# Patient Record
Sex: FEMALE | Race: WHITE | NOT HISPANIC OR LATINO | Employment: OTHER | URBAN - METROPOLITAN AREA
[De-identification: names, ages, dates, MRNs, and addresses within clinical notes are randomized per-mention and may not be internally consistent; named-entity substitution may affect disease eponyms.]

---

## 2017-04-07 ENCOUNTER — OFFICE VISIT (OUTPATIENT)
Dept: FAMILY MEDICINE | Facility: CLINIC | Age: 37
End: 2017-04-07
Payer: COMMERCIAL

## 2017-04-07 VITALS
SYSTOLIC BLOOD PRESSURE: 119 MMHG | HEART RATE: 61 BPM | OXYGEN SATURATION: 99 % | TEMPERATURE: 97 F | BODY MASS INDEX: 24.83 KG/M2 | DIASTOLIC BLOOD PRESSURE: 58 MMHG | WEIGHT: 149 LBS | HEIGHT: 65 IN

## 2017-04-07 DIAGNOSIS — Z20.818 STREP THROAT EXPOSURE: ICD-10-CM

## 2017-04-07 DIAGNOSIS — R07.0 THROAT PAIN: Primary | ICD-10-CM

## 2017-04-07 LAB
DEPRECATED S PYO AG THROAT QL EIA: NORMAL
MICRO REPORT STATUS: NORMAL
SPECIMEN SOURCE: NORMAL

## 2017-04-07 PROCEDURE — 87081 CULTURE SCREEN ONLY: CPT | Performed by: PHYSICIAN ASSISTANT

## 2017-04-07 PROCEDURE — 87880 STREP A ASSAY W/OPTIC: CPT | Performed by: PHYSICIAN ASSISTANT

## 2017-04-07 PROCEDURE — 99213 OFFICE O/P EST LOW 20 MIN: CPT | Performed by: PHYSICIAN ASSISTANT

## 2017-04-07 NOTE — PROGRESS NOTES
SUBJECTIVE:                                                    Brittany Carver is a 36 year old female who presents to clinic today for the following health issues:      RESPIRATORY SYMPTOMS      Duration: 3 days    Description  Throat pain, runny nose, congestion, bumps on both hands    Severity: mild    Accompanying signs and symptoms: None    History (predisposing factors):  strep exposure    Precipitating or alleviating factors: None    Therapies tried and outcome:  none         Problem list and histories reviewed & adjusted, as indicated.  Additional history: as documented    Patient Active Problem List   Diagnosis     CARDIOVASCULAR SCREENING; LDL GOAL LESS THAN 160     Thyroid function study abnormality     Encounter for supervision of other normal pregnancy     Rh negative status during pregnancy     Low-lying placenta     Past Surgical History:   Procedure Laterality Date     C APPENDECTOMY  1993     TONSILLECTOMY  2001    due to obstruction       Social History   Substance Use Topics     Smoking status: Never Smoker     Smokeless tobacco: Never Used     Alcohol use No     Family History   Problem Relation Age of Onset     Breast Cancer Mother 55     age 55     Thyroid Disease Mother      late in life     Thyroid Disease Maternal Grandmother      late in life     Alzheimer Disease Maternal Grandfather      Thyroid Disease Maternal Grandfather      DIABETES Paternal Grandmother      diet and exercise controlled     Thyroid Disease Maternal Aunt      Thyroid Disease Maternal Uncle      CANCER Paternal Grandfather      C.A.D. No family hx of      Hypertension No family hx of      Cancer - colorectal No family hx of      Anesthesia Reaction No family hx of      Blood Disease No family hx of          Current Outpatient Prescriptions   Medication Sig Dispense Refill     MULTI-VITAMIN OR 3 tablets once a day       Allergies   Allergen Reactions     Nkda [No Known Drug Allergies]      Labs reviewed in  "EPIC    Reviewed and updated as needed this visit by clinical staff  Tobacco  Allergies  Meds  Med Hx  Surg Hx  Fam Hx  Soc Hx      Reviewed and updated as needed this visit by Provider         ROS:  Constitutional, HEENT, cardiovascular, pulmonary, gi and gu systems are negative, except as otherwise noted.    OBJECTIVE:                                                    /58  Pulse 61  Temp 97  F (36.1  C) (Oral)  Ht 5' 5\" (1.651 m)  Wt 149 lb (67.6 kg)  SpO2 99%  BMI 24.79 kg/m2  Body mass index is 24.79 kg/(m^2).  GENERAL: healthy, alert and no distress  EYES: Eyes grossly normal to inspection, PERRL and conjunctivae and sclerae normal  HENT: ear canals and TM's normal, nose and mouth without ulcers or lesions  NECK: no adenopathy, no asymmetry, masses, or scars and thyroid normal to palpation  RESP: lungs clear to auscultation - no rales, rhonchi or wheezes  CV: regular rate and rhythm, normal S1 S2, no S3 or S4, no murmur, click or rub, no peripheral edema and peripheral pulses strong    Diagnostic Test Results:  Results for orders placed or performed in visit on 04/07/17 (from the past 24 hour(s))   Rapid strep screen   Result Value Ref Range    Specimen Description Throat     Rapid Strep A Screen       NEGATIVE: No Group A streptococcal antigen detected by immunoassay, await   culture report.      Micro Report Status FINAL 04/07/2017         ASSESSMENT/PLAN:                                                        ICD-10-CM    1. Throat pain R07.0 Rapid strep screen     Beta strep group A culture   2. Strep throat exposure Z20.818    Warning signs discussed.  side effects discussed  Symptomatic treatment: such as fluids,  OTC acetaminophen and /or non-steroidal anti-inflammatory medication.  Follow up  1-2 wks as needed      Felipe Pearson PA-C  [facility]    "

## 2017-04-07 NOTE — MR AVS SNAPSHOT
"              After Visit Summary   4/7/2017    Brittany Carver    MRN: [id number]           Patient Information     Date Of Birth          1980        Visit Information        Provider Department      4/7/2017 2:10 PM Felipe Pearson PA-C [facility]        Today's Diagnoses     Throat pain    -  1    Strep throat exposure           Follow-ups after your visit        Who to contact     If you have questions or need follow up information about today's clinic visit or your schedule please contact [facility] directly at [phone].  Normal or non-critical lab and imaging results will be communicated to you by [employer], letter or phone within 4 business days after the clinic has received the results. If you do not hear from us within 7 days, please contact the clinic through [employer] or phone. If you have a critical or abnormal lab result, we will notify you by phone as soon as possible.  Submit refill requests through [employer] or call your pharmacy and they will forward the refill request to us. Please allow 3 business days for your refill to be completed.          Additional Information About Your Visit        MyChart Information     [employer] gives you secure access to your electronic health record. If you see a primary care provider, you can also send messages to your care team and make appointments. If you have questions, please call your primary care clinic.  If you do not have a primary care provider, please call [phone] and they will assist you.        Care EveryWhere ID     This is your Care EveryWhere ID. This could be used by other organizations to access your [place] medical records  [id number]        Your Vitals Were     Pulse Temperature Height Pulse Oximetry BMI (Body Mass Index)       61 97  F (36.1  C) (Oral) 5' 5\" (1.651 m) 99% 24.79 kg/m2        Blood Pressure from Last 3 Encounters:   04/07/17 119/58   11/14/16 126/76   11/02/16 109/59    Weight from " Last 3 Encounters:   04/07/17 149 lb (67.6 kg)   11/14/16 152 lb 9.6 oz (69.2 kg)   11/02/16 146 lb (66.2 kg)              We Performed the Following     Beta strep group A culture     Rapid strep screen        Primary Care Provider Office Phone # Fax #    Yaya Richard [phone]       [facility] [address] [facility] [address]        Thank you!     Thank you for choosing [facility]  for your care. Our goal is always to provide you with excellent care. Hearing back from our patients is one way we can continue to improve our services. Please take a few minutes to complete the written survey that you may receive in the mail after your visit with us. Thank you!             Your Updated Medication List - Protect others around you: Learn how to safely use, store and throw away your medicines at www.disposemymeds.org.          This list is accurate as of: 4/7/17  3:34 PM.  Always use your most recent med list.                   Brand Name Dispense Instructions for use    MULTI-VITAMIN PO      3 tablets once a day

## 2017-04-07 NOTE — NURSING NOTE
"Chief Complaint   Patient presents with     Pharyngitis       Initial /58  Pulse 61  Temp 97  F (36.1  C) (Oral)  Ht 5' 5\" (1.651 m)  Wt 149 lb (67.6 kg)  SpO2 99%  BMI 24.79 kg/m2 Estimated body mass index is 24.79 kg/(m^2) as calculated from the following:    Height as of this encounter: 5' 5\" (1.651 m).    Weight as of this encounter: 149 lb (67.6 kg).  Medication Reconciliation: complete     Kiesha Carroll, viraj      "

## 2017-04-09 LAB
BACTERIA SPEC CULT: NORMAL
MICRO REPORT STATUS: NORMAL
SPECIMEN SOURCE: NORMAL

## 2017-10-01 ENCOUNTER — HEALTH MAINTENANCE LETTER (OUTPATIENT)
Age: 37
End: 2017-10-01

## 2019-11-14 ENCOUNTER — DOCUMENTATION ONLY (OUTPATIENT)
Dept: LAB | Facility: CLINIC | Age: 39
End: 2019-11-14

## 2019-11-14 DIAGNOSIS — R94.6 THYROID FUNCTION STUDY ABNORMALITY: ICD-10-CM

## 2019-11-14 DIAGNOSIS — Z00.00 ROUTINE GENERAL MEDICAL EXAMINATION AT A HEALTH CARE FACILITY: ICD-10-CM

## 2019-11-14 DIAGNOSIS — Z13.1 SCREENING FOR DIABETES MELLITUS: ICD-10-CM

## 2019-11-14 DIAGNOSIS — Z13.6 CARDIOVASCULAR SCREENING; LDL GOAL LESS THAN 160: Primary | ICD-10-CM

## 2019-11-14 NOTE — PROGRESS NOTES
Patient has an up coming pre visit lab appointment on 11.19.2019 and is also requesting a TSH. Please review and place future orders that may be needed.     Thank you  Sarita Truong MLT ([facility])

## 2019-11-15 NOTE — PROGRESS NOTES
Please review and sign lab orders.  The patients wants her Thyroid labs checked so I pended an order for you to decide.  Lab 11/19/19  Physical 11/26/19  Kenyatta Villalobos,

## 2019-11-19 DIAGNOSIS — R94.6 THYROID FUNCTION STUDY ABNORMALITY: ICD-10-CM

## 2019-11-19 DIAGNOSIS — Z13.1 SCREENING FOR DIABETES MELLITUS: ICD-10-CM

## 2019-11-19 DIAGNOSIS — Z00.00 ROUTINE GENERAL MEDICAL EXAMINATION AT A HEALTH CARE FACILITY: ICD-10-CM

## 2019-11-19 DIAGNOSIS — Z13.6 CARDIOVASCULAR SCREENING; LDL GOAL LESS THAN 160: ICD-10-CM

## 2019-11-19 LAB
ALBUMIN SERPL-MCNC: 3.6 G/DL (ref 3.4–5)
ALP SERPL-CCNC: 67 U/L (ref 40–150)
ALT SERPL W P-5'-P-CCNC: 23 U/L (ref 0–50)
ANION GAP SERPL CALCULATED.3IONS-SCNC: 5 MMOL/L (ref 3–14)
AST SERPL W P-5'-P-CCNC: 15 U/L (ref 0–45)
BILIRUB SERPL-MCNC: 0.5 MG/DL (ref 0.2–1.3)
BUN SERPL-MCNC: 9 MG/DL (ref 7–30)
CALCIUM SERPL-MCNC: 8.5 MG/DL (ref 8.5–10.1)
CHLORIDE SERPL-SCNC: 105 MMOL/L (ref 94–109)
CHOLEST SERPL-MCNC: 170 MG/DL
CO2 SERPL-SCNC: 28 MMOL/L (ref 20–32)
CREAT SERPL-MCNC: 0.77 MG/DL (ref 0.52–1.04)
GFR SERPL CREATININE-BSD FRML MDRD: >90 ML/MIN/{1.73_M2}
GLUCOSE SERPL-MCNC: 83 MG/DL (ref 70–99)
HDLC SERPL-MCNC: 60 MG/DL
LDLC SERPL CALC-MCNC: 86 MG/DL
NONHDLC SERPL-MCNC: 110 MG/DL
POTASSIUM SERPL-SCNC: 3.8 MMOL/L (ref 3.4–5.3)
PROT SERPL-MCNC: 6.9 G/DL (ref 6.8–8.8)
SODIUM SERPL-SCNC: 138 MMOL/L (ref 133–144)
T4 FREE SERPL-MCNC: 0.77 NG/DL (ref 0.76–1.46)
TRIGL SERPL-MCNC: 122 MG/DL
TSH SERPL DL<=0.005 MIU/L-ACNC: 11.11 MU/L (ref 0.4–4)

## 2019-11-19 PROCEDURE — 80053 COMPREHEN METABOLIC PANEL: CPT | Performed by: FAMILY MEDICINE

## 2019-11-19 PROCEDURE — 80061 LIPID PANEL: CPT | Performed by: FAMILY MEDICINE

## 2019-11-19 PROCEDURE — 36415 COLL VENOUS BLD VENIPUNCTURE: CPT | Performed by: FAMILY MEDICINE

## 2019-11-19 PROCEDURE — 84439 ASSAY OF FREE THYROXINE: CPT | Performed by: FAMILY MEDICINE

## 2019-11-19 PROCEDURE — 84443 ASSAY THYROID STIM HORMONE: CPT | Performed by: FAMILY MEDICINE

## 2019-11-26 ENCOUNTER — OFFICE VISIT (OUTPATIENT)
Dept: FAMILY MEDICINE | Facility: CLINIC | Age: 39
End: 2019-11-26
Payer: COMMERCIAL

## 2019-11-26 VITALS
HEIGHT: 65 IN | BODY MASS INDEX: 26.33 KG/M2 | WEIGHT: 158 LBS | TEMPERATURE: 98.3 F | HEART RATE: 91 BPM | SYSTOLIC BLOOD PRESSURE: 123 MMHG | DIASTOLIC BLOOD PRESSURE: 71 MMHG

## 2019-11-26 DIAGNOSIS — E03.9 HYPOTHYROIDISM, UNSPECIFIED TYPE: ICD-10-CM

## 2019-11-26 DIAGNOSIS — Z00.00 ROUTINE GENERAL MEDICAL EXAMINATION AT A HEALTH CARE FACILITY: ICD-10-CM

## 2019-11-26 DIAGNOSIS — Z12.4 SCREENING FOR MALIGNANT NEOPLASM OF CERVIX: Primary | ICD-10-CM

## 2019-11-26 DIAGNOSIS — Z28.21 VACCINATION REFUSED BY PATIENT: ICD-10-CM

## 2019-11-26 PROCEDURE — 87624 HPV HI-RISK TYP POOLED RSLT: CPT | Performed by: FAMILY MEDICINE

## 2019-11-26 PROCEDURE — G0145 SCR C/V CYTO,THINLAYER,RESCR: HCPCS | Performed by: FAMILY MEDICINE

## 2019-11-26 PROCEDURE — 99395 PREV VISIT EST AGE 18-39: CPT | Performed by: FAMILY MEDICINE

## 2019-11-26 RX ORDER — LEVOTHYROXINE SODIUM 50 UG/1
50 TABLET ORAL DAILY
Qty: 30 TABLET | Refills: 2 | Status: SHIPPED | OUTPATIENT
Start: 2019-11-26 | End: 2020-01-21

## 2019-11-26 ASSESSMENT — ENCOUNTER SYMPTOMS: BREAST MASS: 0

## 2019-11-26 ASSESSMENT — MIFFLIN-ST. JEOR: SCORE: 1397.56

## 2019-11-26 NOTE — PATIENT INSTRUCTIONS

## 2019-11-26 NOTE — PROGRESS NOTES
SUBJECTIVE:   CC: Brittany Carver is an 39 year old woman who presents for preventive health visit.     Healthy Habits:     Getting at least 3 servings of Calcium per day:  Yes    Bi-annual eye exam:  Yes    Dental care twice a year:  Yes    Sleep apnea or symptoms of sleep apnea:  None    Diet:  Regular (no restrictions)    Frequency of exercise:  None    Taking medications regularly:  Yes    Medication side effects:  None    PHQ-2 Total Score: 0    Additional concerns today:  Yes              Today's PHQ-2 Score:   PHQ-2 ( 1999 Pfizer) 11/26/2019   Q1: Little interest or pleasure in doing things 0   Q2: Feeling down, depressed or hopeless 0   PHQ-2 Score 0   Q1: Little interest or pleasure in doing things Not at all   Q2: Feeling down, depressed or hopeless Not at all   PHQ-2 Score 0       Abuse: Current or Past(Physical, Sexual or Emotional)- No  Do you feel safe in your environment? Yes        Social History     Tobacco Use     Smoking status: Never Smoker     Smokeless tobacco: Never Used   Substance Use Topics     Alcohol use: No         Alcohol Use 11/26/2019   Prescreen: >3 drinks/day or >7 drinks/week? Not Applicable       Reviewed orders with patient.  Reviewed health maintenance and updated orders accordingly - Yes          Pertinent mammograms are reviewed under the imaging tab.  History of abnormal Pap smear:   PAP / HPV 2/19/2013 1/5/2010   PAP NIL NIL     Reviewed and updated as needed this visit by clinical staff         Reviewed and updated as needed this visit by Provider            Review of Systems   Breasts:  Negative for tenderness, breast mass and discharge.   Genitourinary: Negative for pelvic pain, vaginal bleeding and vaginal discharge.          OBJECTIVE:   There were no vitals taken for this visit.  Physical Exam          ASSESSMENT/PLAN:       ICD-10-CM    1. Screening for malignant neoplasm of cervix Z12.4 Pap imaged thin layer screen with HPV - recommended age 30 - 65 years  "(select HPV order below)     HPV High Risk Types DNA Cervical   2. Routine general medical examination at a health care facility Z00.00    3. Hypothyroidism, unspecified type E03.9 levothyroxine (SYNTHROID/LEVOTHROID) 50 MCG tablet     **TSH with free T4 reflex FUTURE 2mo   4. Vaccination refused by patient Z28.21        COUNSELING:  Reviewed preventive health counseling, as reflected in patient instructions       Regular exercise       Healthy diet/nutrition       Vision screening       Contraception       Family planning       Osteoporosis Prevention/Bone Health       HIV screeninx in teen years, 1x in adult years, and at intervals if high risk    Estimated body mass index is 24.79 kg/m  as calculated from the following:    Height as of 17: 1.651 m (5' 5\").    Weight as of 17: 67.6 kg (149 lb).    Weight management plan: start levo to treat her hypothyroidism      reports that she has never smoked. She has never used smokeless tobacco.      Counseling Resources:  ATP IV Guidelines  Pooled Cohorts Equation Calculator  Breast Cancer Risk Calculator  FRAX Risk Assessment  ICSI Preventive Guidelines  Dietary Guidelines for Americans,   [employer]'s MyPlate  ASA Prophylaxis  Lung CA Screening    Yaya Richard MD  [facility]  --------------------------------------------------------------------------------------------------------------------------------------    SUBJECTIVE:  Brittany Carver is a 39 year old female who presents to the clinic today for a routine physical exam.    The patient's last physical was .    Cholesterol   Date Value Ref Range Status   2019 170 <200 mg/dL Final   2010 184 0 - 200 mg/dL Final     Comment:     LDL Cholesterol is the primary guide to therapy.   The NCEP recommends further evaluation of: patients with cholesterol <200   mg/dL   if additional risk factors are present, cholesterol >240 mg/dL, triglycerides   >150 mg/dL, or HDL <40 mg/dL. "     HDL Cholesterol   Date Value Ref Range Status   11/19/2019 60 >49 mg/dL Final   08/30/2010 63 50 - 110 mg/dL Final     LDL Cholesterol Calculated   Date Value Ref Range Status   11/19/2019 86 <100 mg/dL Final     Comment:     Desirable:       <100 mg/dl   08/30/2010 109 0 - 129 mg/dL Final     Comment:     LDL Cholesterol is the primary guide to therapy: LDL-cholesterol goal in high   risk patients is <100 mg/dL and in very high risk patients is <70 mg/dL.     Triglycerides   Date Value Ref Range Status   11/19/2019 122 <150 mg/dL Final     Comment:     Fasting specimen   08/30/2010 61 0 - 150 mg/dL Final     Cholesterol/HDL Ratio   Date Value Ref Range Status   08/30/2010 2.9 0.0 - 5.0 Final     The patient's last fasting lipid panel was done 1 weeks ago and the results are listed above.      The ASCVD Risk score (Earl MILLER Jr., et al., 2013) failed to calculate for the following reasons:    The 2013 ASCVD risk score is only valid for ages 40 to 79      0.3% using an age of 40 on [employer] ATHEROSCLEROTIC CARDIOVASCULAR DISEASE marika       Risk Enhancers:  Family history of premature ASCVD  LDL >159  Chronic kidney disease   Metabolic Syndrome  Premature menopause  Inflammatory conditions (RA, psoriasis, HIV)  SE  Ancestry  Triglycerides >174        The patient reports that she has never been treated for high blood pressure.    The patient reports that she does not take a daily aspirin.        No results found for: HCVAB  The patient reports that she has not been screened for Hepatitis C    (Screen all baby boomers once per CDC-- the generation born from 1945 through 1965 and per USPTF screen age 19 to 79 especially younger people who have used IV drugs)  She would not like to have an Hepatitis C test today    No results found for: HIAGAB  The patient reports that she has been screened for HIV   (Screen all 15 to 64 years old)  She would not like to have an HIV test today            Immunization History    Administered Date(s) Administered     TD (ADULT, 7+) 04/01/2004     The patient has not started the Gardasil vaccination series.  The patient's believes that her last tetanus shot was given 15 year(s) ago.   The patient believes that she has not had a Shingrix in the past  The patient believes that she has not had a PPSV23 in the past.  The patient believes that she has not had a PCV13 in the past.  The patient believes that she has not had a seasonal flu vaccination this fall or winter.  The patient would like to have a no vaccinations today      No results found for this or any previous visit.]   The patient denies a family history of colon cancer.  The patient reports that she has not had a colonoscopy.     The patient reports a family history diabetes in her granmother.    The patient reports that she does performs a self breast exam monthly.  The patient reports a family history of breast cancer in her mother and maternal grandmother.  The patient does not want to have a mammogram done.  The patient does want to have a Pap smear done.  She reports that she has not had an abnormal pap smear.  The patient is not interested in contraception.    The patient reports that she eats or drinks 3 servings of dairy products per day.  The patient reports that she has not had a bone density scan.   (screen women 65+ or 50+ with risk factors)     The patient reports that she has dental appointments approximately every 6 months.  The patient reports that she  has an eye examination approximately every 1.0 year(s).        Do you currently smoke? No  How many years have you smoked? 0  How many packs per day did you smoke on average? N/A  (if more than 30 pack year history and the patient is age 55-80 consider ordering an annual low dose radiation lung CT to screen for cancer)  (Do not order if patient has quit more than 15 years ago or has a health condition that limits life expectancy or could not tolerate curative lung  surgery)  Are you interested having a lung CT to screen for lung cancer? N/A          Past Medical History:   Diagnosis Date     NO ACTIVE PROBLEMS        Past Surgical History:   Procedure Laterality Date     C APPENDECTOMY  1993     TONSILLECTOMY  2001    due to obstruction       Family History   Problem Relation Age of Onset     Breast Cancer Mother 55        age 55     Thyroid Disease Mother         late in life     Thyroid Disease Maternal Grandmother         late in life     Alzheimer Disease Maternal Grandfather      Thyroid Disease Maternal Grandfather      Diabetes Paternal Grandmother         diet and exercise controlled     Thyroid Disease Maternal Aunt      Thyroid Disease Maternal Uncle      Cancer Paternal Grandfather      Thyroid Disease Other      C.A.D. No family hx of      Hypertension No family hx of      Cancer - colorectal No family hx of      Anesthesia Reaction No family hx of      Blood Disease No family hx of        Social History     Socioeconomic History     Marital status:      Spouse name: Not on file     Number of children: Not on file     Years of education: Not on file     Highest education level: Not on file   Occupational History     Not on file   Social Needs     Financial resource strain: Not on file     Food insecurity:     Worry: Not on file     Inability: Not on file     Transportation needs:     Medical: Not on file     Non-medical: Not on file   Tobacco Use     Smoking status: Never Smoker     Smokeless tobacco: Never Used   Substance and Sexual Activity     Alcohol use: No     Drug use: No     Sexual activity: Yes     Partners: Male   Lifestyle     Physical activity:     Days per week: Not on file     Minutes per session: Not on file     Stress: Not on file   Relationships     Social connections:     Talks on phone: Not on file     Gets together: Not on file     Attends Hindu service: Not on file     Active member of club or organization: Not on file     Attends  "meetings of clubs or organizations: Not on file     Relationship status: Not on file     Intimate partner violence:     Fear of current or ex partner: Not on file     Emotionally abused: Not on file     Physically abused: Not on file     Forced sexual activity: Not on file   Other Topics Concern     Parent/sibling w/ CABG, MI or angioplasty before 65F 55M? Not Asked   Social History Narrative     Not on file       Current Outpatient Medications   Medication Sig Dispense Refill     MULTI-VITAMIN OR 3 tablets once a day           PHYSICAL EXAMINATION:  Blood pressure 123/71, pulse 91, temperature 98.3  F (36.8  C), temperature source Oral, height 1.651 m (5' 5\"), weight 71.7 kg (158 lb), unknown if currently breastfeeding.  General appearance - healthy, alert and no distress  Skin - Skin color, texture, turgor normal. No rashes or lesions.  Head - Normocephalic. No masses, lesions, tenderness or abnormalities  Eyes - conjunctivae/corneas clear. PERRL, EOM's intact. Fundi benign  Ears - External ears normal. Canals clear. TM's normal.  Nose/Sinuses - Nares normal. Septum midline. Mucosa normal. No drainage or sinus tenderness.  Oropharynx - Lips, mucosa, and tongue normal. Teeth and gums normal.  Neck - Neck supple. No adenopathy. Thyroid symmetric,  positive findings: mild thyromegaly  Lungs - Percussion normal. Good diaphragmatic excursion. Lungs clear  Heart - PMI normal. No lifts, heaves, or thrills. RRR. No murmurs, clicks gallops or rub  Breasts - Breasts normal to inspection and palpation. Axillae negative  Abdomen - Abdomen soft, non-tender. BS normal. No masses, organomegaly  Extremities - Extremities normal. No deformities, edema, or skin discoloration.  Musculoskeletal - Spine ROM normal. Muscular strength intact.  Peripheral pulses - radial=4/4, femoral=4/4, popliteal=4/4, dorsalis pedis=4/4,  Neuro - Gait normal. Reflexes normal and symmetric. Sensation grossly WNL.  Pelvic exam: normal vagina and vulva, " normal cervix without lesions or tenderness, uterus normal size anteverted, adenxa normal in size without tenderness, pap smear done, exam chaperoned by nurse.        Orders Only on 11/19/2019   Component Date Value Ref Range Status     Sodium 11/19/2019 138  133 - 144 mmol/L Final     Potassium 11/19/2019 3.8  3.4 - 5.3 mmol/L Final     Chloride 11/19/2019 105  94 - 109 mmol/L Final     Carbon Dioxide 11/19/2019 28  20 - 32 mmol/L Final     Anion Gap 11/19/2019 5  3 - 14 mmol/L Final     Glucose 11/19/2019 83  70 - 99 mg/dL Final    Fasting specimen     Urea Nitrogen 11/19/2019 9  7 - 30 mg/dL Final     Creatinine 11/19/2019 0.77  0.52 - 1.04 mg/dL Final     GFR Estimate 11/19/2019 >90  >60 mL/min/[1.73_m2] Final    Comment: Non  GFR Calc  Starting 12/18/2018, serum creatinine based estimated GFR (eGFR) will be   calculated using the Chronic Kidney Disease Epidemiology Collaboration   (CKD-EPI) equation.       GFR Estimate If Black 11/19/2019 >90  >60 mL/min/[1.73_m2] Final    Comment:  GFR Calc  Starting 12/18/2018, serum creatinine based estimated GFR (eGFR) will be   calculated using the Chronic Kidney Disease Epidemiology Collaboration   (CKD-EPI) equation.       Calcium 11/19/2019 8.5  8.5 - 10.1 mg/dL Final     Bilirubin Total 11/19/2019 0.5  0.2 - 1.3 mg/dL Final     Albumin 11/19/2019 3.6  3.4 - 5.0 g/dL Final     Protein Total 11/19/2019 6.9  6.8 - 8.8 g/dL Final     Alkaline Phosphatase 11/19/2019 67  40 - 150 U/L Final     ALT 11/19/2019 23  0 - 50 U/L Final     AST 11/19/2019 15  0 - 45 U/L Final     TSH 11/19/2019 11.11* 0.40 - 4.00 mU/L Final     Cholesterol 11/19/2019 170  <200 mg/dL Final     Triglycerides 11/19/2019 122  <150 mg/dL Final    Fasting specimen     HDL Cholesterol 11/19/2019 60  >49 mg/dL Final     LDL Cholesterol Calculated 11/19/2019 86  <100 mg/dL Final    Desirable:       <100 mg/dl     Non HDL Cholesterol 11/19/2019 110  <130 mg/dL Final     T4 Free  11/19/2019 0.77  0.76 - 1.46 ng/dL Final       ASSESSMENT:    ICD-10-CM    1. Screening for malignant neoplasm of cervix Z12.4 Pap imaged thin layer screen with HPV - recommended age 30 - 65 years (select HPV order below)     HPV High Risk Types DNA Cervical   2. Routine general medical examination at a health care facility Z00.00        Well-Adult Physical Exam.  Health Maintenance Due   Topic Date Due     PREVENTIVE CARE VISIT  09/07/2011     DTAP/TDAP/TD IMMUNIZATION (2 - Td) 04/01/2014     HPV  02/19/2018     PAP  02/19/2018     PHQ-2  01/01/2019     INFLUENZA VACCINE (1) 09/01/2019     Health Maintenance   Topic Date Due     PREVENTIVE CARE VISIT  09/07/2011     DTAP/TDAP/TD IMMUNIZATION (2 - Td) 04/01/2014     HPV  02/19/2018     PAP  02/19/2018     PHQ-2  01/01/2019     INFLUENZA VACCINE (1) 09/01/2019     HIV SCREENING  Completed     IPV IMMUNIZATION  Aged Out     MENINGITIS IMMUNIZATION  Aged Out         HEALTH CARE MAINTENENCE: The recommended screening tests and vaccinatons for this patient have been discussed as above.  The appropriate tests and vaccinations  have been ordered or declined by the patient. Please see the orders in EPIC.The patient specifically declines: vaccination(s)     Immunization Status:  up to date and documented except for influenza, tetanus    Patient Active Problem List   Diagnosis     CARDIOVASCULAR SCREENING; LDL GOAL LESS THAN 160     Thyroid function study abnormality     Encounter for supervision of other normal pregnancy     Rh negative status during pregnancy     Low-lying placenta        ATP III Guidelines  ICSI Preventive Guidelines    PLAN:  Start levothyroxine 50 mcg daily and recheck TSH in 6-8 weeks   HIV testing was discussed but the pt declined  Tdap recommended  Flu shot recommended  Breast self exam demonstrated and recommended  Pap smear was recommended and obtained  Discussed calcium intake, vitamins and supplements. Recommended 1200 mg of calcium  daily  Sunscreen use was recommended especially in the area of tatoos  Recommended dental exams every 6 months  Follow up in 1 year for the next preventative medical visit      Osteoporosis TX indications:  Post menopausal women with a hip or vertebral fracture  Any T score less than or equal to -2.5  Any T score between -1.0 and -2.5 and a 10 year hip fracture probability > or = to 3%  Any T score between -1.0 and -2.5 and a 10 year probability or a major osteoporosis-related fracture  > or = 20% based on FRAX score  [email or web address]                Body mass index is 26.29 kg/m .

## 2019-12-02 LAB
COPATH REPORT: NORMAL
PAP: NORMAL

## 2019-12-03 LAB
FINAL DIAGNOSIS: NORMAL
HPV HR 12 DNA CVX QL NAA+PROBE: NEGATIVE
HPV16 DNA SPEC QL NAA+PROBE: NEGATIVE
HPV18 DNA SPEC QL NAA+PROBE: NEGATIVE
SPECIMEN DESCRIPTION: NORMAL
SPECIMEN SOURCE CVX/VAG CYTO: NORMAL

## 2020-01-21 DIAGNOSIS — E03.9 HYPOTHYROIDISM, UNSPECIFIED TYPE: ICD-10-CM

## 2020-01-21 LAB
T4 FREE SERPL-MCNC: 1.27 NG/DL (ref 0.76–1.46)
TSH SERPL DL<=0.005 MIU/L-ACNC: 0.35 MU/L (ref 0.4–4)

## 2020-01-21 PROCEDURE — 36415 COLL VENOUS BLD VENIPUNCTURE: CPT | Performed by: FAMILY MEDICINE

## 2020-01-21 PROCEDURE — 84439 ASSAY OF FREE THYROXINE: CPT | Performed by: FAMILY MEDICINE

## 2020-01-21 PROCEDURE — 84443 ASSAY THYROID STIM HORMONE: CPT | Performed by: FAMILY MEDICINE

## 2020-01-21 RX ORDER — LEVOTHYROXINE SODIUM 88 UG/1
44 TABLET ORAL DAILY
Qty: 45 TABLET | Refills: 3 | Status: SHIPPED | OUTPATIENT
Start: 2020-01-21 | End: 2021-02-24

## 2020-02-23 ENCOUNTER — HEALTH MAINTENANCE LETTER (OUTPATIENT)
Age: 40
End: 2020-02-23

## 2020-12-13 ENCOUNTER — HEALTH MAINTENANCE LETTER (OUTPATIENT)
Age: 40
End: 2020-12-13

## 2021-01-15 ENCOUNTER — HEALTH MAINTENANCE LETTER (OUTPATIENT)
Age: 41
End: 2021-01-15

## 2021-02-23 DIAGNOSIS — E03.9 HYPOTHYROIDISM, UNSPECIFIED TYPE: ICD-10-CM

## 2021-02-24 RX ORDER — LEVOTHYROXINE SODIUM 88 UG/1
TABLET ORAL
Qty: 45 TABLET | Refills: 1 | Status: SHIPPED | OUTPATIENT
Start: 2021-02-24 | End: 2021-03-05

## 2021-02-24 NOTE — TELEPHONE ENCOUNTER
Due for TSH.    Next 5 appointments (look out 90 days)    Mar 12, 2021  3:00 PM  PHYSICAL with Yaya Richard MD  [facility] ([facility] ) [address] [facility] [address] [phone]        Bambi Patel BSN, RN

## 2021-03-03 ENCOUNTER — DOCUMENTATION ONLY (OUTPATIENT)
Dept: LAB | Facility: CLINIC | Age: 41
End: 2021-03-03

## 2021-03-03 DIAGNOSIS — Z13.1 SCREENING FOR DIABETES MELLITUS: ICD-10-CM

## 2021-03-03 DIAGNOSIS — Z00.00 ROUTINE HISTORY AND PHYSICAL EXAMINATION OF ADULT: Primary | ICD-10-CM

## 2021-03-03 DIAGNOSIS — R94.6 THYROID FUNCTION STUDY ABNORMALITY: ICD-10-CM

## 2021-03-03 DIAGNOSIS — Z13.6 CARDIOVASCULAR SCREENING; LDL GOAL LESS THAN 160: ICD-10-CM

## 2021-03-03 DIAGNOSIS — Z11.59 ENCOUNTER FOR HEPATITIS C SCREENING TEST FOR LOW RISK PATIENT: ICD-10-CM

## 2021-03-03 NOTE — PROGRESS NOTES
Your patient has a lab appointment on 3/5/21 for pre-visit labs. At this time there are no orders placed for there lab appointment. Please review and sign orders prior to there appointment time. Thank you.    [facility]    Sheri Guzmán CMA (Lab)

## 2021-03-05 DIAGNOSIS — Z00.00 ROUTINE HISTORY AND PHYSICAL EXAMINATION OF ADULT: ICD-10-CM

## 2021-03-05 DIAGNOSIS — R94.6 THYROID FUNCTION STUDY ABNORMALITY: ICD-10-CM

## 2021-03-05 DIAGNOSIS — Z13.1 SCREENING FOR DIABETES MELLITUS: ICD-10-CM

## 2021-03-05 DIAGNOSIS — E03.9 HYPOTHYROIDISM, UNSPECIFIED TYPE: ICD-10-CM

## 2021-03-05 DIAGNOSIS — Z11.59 ENCOUNTER FOR HEPATITIS C SCREENING TEST FOR LOW RISK PATIENT: ICD-10-CM

## 2021-03-05 DIAGNOSIS — Z13.6 CARDIOVASCULAR SCREENING; LDL GOAL LESS THAN 160: ICD-10-CM

## 2021-03-05 LAB
ALBUMIN SERPL-MCNC: 3.4 G/DL (ref 3.4–5)
ALP SERPL-CCNC: 71 U/L (ref 40–150)
ALT SERPL W P-5'-P-CCNC: 22 U/L (ref 0–50)
ANION GAP SERPL CALCULATED.3IONS-SCNC: 4 MMOL/L (ref 3–14)
AST SERPL W P-5'-P-CCNC: 19 U/L (ref 0–45)
BILIRUB SERPL-MCNC: 0.5 MG/DL (ref 0.2–1.3)
BUN SERPL-MCNC: 12 MG/DL (ref 7–30)
CALCIUM SERPL-MCNC: 8.3 MG/DL (ref 8.5–10.1)
CHLORIDE SERPL-SCNC: 109 MMOL/L (ref 94–109)
CHOLEST SERPL-MCNC: 167 MG/DL
CO2 SERPL-SCNC: 26 MMOL/L (ref 20–32)
CREAT SERPL-MCNC: 0.72 MG/DL (ref 0.52–1.04)
GFR SERPL CREATININE-BSD FRML MDRD: >90 ML/MIN/{1.73_M2}
GLUCOSE SERPL-MCNC: 85 MG/DL (ref 70–99)
HCV AB SERPL QL IA: NONREACTIVE
HDLC SERPL-MCNC: 67 MG/DL
LDLC SERPL CALC-MCNC: 85 MG/DL
NONHDLC SERPL-MCNC: 100 MG/DL
POTASSIUM SERPL-SCNC: 4.1 MMOL/L (ref 3.4–5.3)
PROT SERPL-MCNC: 7.1 G/DL (ref 6.8–8.8)
SODIUM SERPL-SCNC: 139 MMOL/L (ref 133–144)
T4 FREE SERPL-MCNC: 0.72 NG/DL (ref 0.76–1.46)
TRIGL SERPL-MCNC: 74 MG/DL
TSH SERPL DL<=0.005 MIU/L-ACNC: 7.55 MU/L (ref 0.4–4)

## 2021-03-05 PROCEDURE — 36415 COLL VENOUS BLD VENIPUNCTURE: CPT | Performed by: FAMILY MEDICINE

## 2021-03-05 PROCEDURE — 80053 COMPREHEN METABOLIC PANEL: CPT | Performed by: FAMILY MEDICINE

## 2021-03-05 PROCEDURE — 84439 ASSAY OF FREE THYROXINE: CPT | Performed by: FAMILY MEDICINE

## 2021-03-05 PROCEDURE — 84443 ASSAY THYROID STIM HORMONE: CPT | Performed by: FAMILY MEDICINE

## 2021-03-05 PROCEDURE — 86803 HEPATITIS C AB TEST: CPT | Performed by: FAMILY MEDICINE

## 2021-03-05 PROCEDURE — 80061 LIPID PANEL: CPT | Performed by: FAMILY MEDICINE

## 2021-03-05 RX ORDER — LEVOTHYROXINE SODIUM 50 UG/1
50 TABLET ORAL DAILY
Qty: 30 TABLET | Refills: 2 | Status: SHIPPED | OUTPATIENT
Start: 2021-03-05 | End: 2021-07-28

## 2021-03-11 NOTE — PROGRESS NOTES
"   SUBJECTIVE:   CC: Brittany Carver is an 40 year old woman who presents for preventive health visit.     {Split Bill scripting  The purpose of this visit is to discuss your medical history and prevent health problems before you are sick. You may be responsible for a co-pay, coinsurance, or deductible if your visit today includes services such as checking on a sore throat, having an x-ray or lab test, or treating and evaluating a new or existing condition :427764}  Patient has been advised of split billing requirements and indicates understanding: {Yes and No:817149}  Healthy Habits:    Do you get at least three servings of calcium containing foods daily (dairy, green leafy vegetables, etc.)? { :684834::\"yes\"}    Amount of exercise or daily activities, outside of work: { :187149}    Problems taking medications regularly { :355184::\"No\"}    Medication side effects: { :814850::\"No\"}    Have you had an eye exam in the past two years? { :159577}    Do you see a dentist twice per year? { :715875}    Do you have sleep apnea, excessive snoring or daytime drowsiness?{ :474140}  {Outside tests to abstract? :046000}    {additional problems to add (Optional):573488}    Today's PHQ-2 Score:   PHQ-2 ( 1999 Pfizer) 11/26/2019 11/2/2016   Q1: Little interest or pleasure in doing things 0 0   Q2: Feeling down, depressed or hopeless 0 0   PHQ-2 Score 0 0   Q1: Little interest or pleasure in doing things Not at all -   Q2: Feeling down, depressed or hopeless Not at all -   PHQ-2 Score 0 -     {PHQ-2 LOOK IN ASSESSMENTS (Optional) :077418}  Abuse: Current or Past(Physical, Sexual or Emotional)- {YES/NO/NA:712434}  Do you feel safe in your environment? {YES/NO/NA:310341}    Have you ever done Advance Care Planning? (For example, a Health Directive, POLST, or a discussion with a medical provider or your loved ones about your wishes): { :111594}    Social History     Tobacco Use     Smoking status: Never Smoker     Smokeless " "tobacco: Never Used   Substance Use Topics     Alcohol use: No     If you drink alcohol do you typically have >3 drinks per day or >7 drinks per week? {ETOH :730065}                     Reviewed orders with patient.  Reviewed health maintenance and updated orders accordingly - {Yes/No:980618::\"Yes\"}  {Chronicprobdata (Optional):036798}    Breast CA Risk Screening:  No flowsheet data found.  {Pull in link for FHS-7 answers if completed after opening note (Optional) :300484}  {If any of the questions to the BCRA (FHS-7) are answered yes, consider ordering referral for genetic counseling (Optional) :222012::\"click delete button to remove this line now\"}  {AMB Mammogram Decision Support (Optional) :554493}  Pertinent mammograms are reviewed under the imaging tab.    Pertinent mammograms are reviewed under the imaging tab.  History of abnormal Pap smear: {PAP HX:464828}  PAP / HPV Latest Ref Rng & Units 11/26/2019 2/19/2013 1/5/2010   PAP - NIL NIL NIL   HPV 16 DNA NEG:Negative Negative - -   HPV 18 DNA NEG:Negative Negative - -   OTHER HR HPV NEG:Negative Negative - -     Reviewed and updated as needed this visit by clinical staff                 Reviewed and updated as needed this visit by Provider                {HISTORY OPTIONS (Optional):030776}    ROS:  { :972487}    OBJECTIVE:   There were no vitals taken for this visit.  EXAM:  {Exam Choices:335338}    {Diagnostic Test Results (Optional):407528::\"Diagnostic Test Results:\",\"Labs reviewed in Epic\"}    ASSESSMENT/PLAN:   {Diag Picklist:841805}    Patient has been advised of split billing requirements and indicates understanding: {YES / NO:002829::\"Yes\"}  COUNSELING:   {FEMALE COUNSELING MESSAGES:283606::\"Reviewed preventive health counseling, as reflected in patient instructions\"}    Estimated body mass index is 26.29 kg/m  as calculated from the following:    Height as of 11/26/19: 1.651 m (5' 5\").    Weight as of 11/26/19: 71.7 kg (158 lb).    {Weight Management " Plan (ACO) Complete if BMI is abnormal-  Ages 18-64  BMI >24.9.  Age 65+ with BMI <23 or >30 (Optional):451692}    She reports that she has never smoked. She has never used smokeless tobacco.      Counseling Resources:  ATP IV Guidelines  Pooled Cohorts Equation Calculator  Breast Cancer Risk Calculator  BRCA-Related Cancer Risk Assessment: FHS-7 Tool  FRAX Risk Assessment  ICSI Preventive Guidelines  Dietary Guidelines for Americans, 2010  USDA's MyPlate  ASA Prophylaxis  Lung CA Screening    Yaya Richard MD  [facility]

## 2021-03-11 NOTE — PATIENT INSTRUCTIONS

## 2021-03-12 ENCOUNTER — OFFICE VISIT (OUTPATIENT)
Dept: FAMILY MEDICINE | Facility: CLINIC | Age: 41
End: 2021-03-12
Payer: COMMERCIAL

## 2021-03-12 VITALS
WEIGHT: 170 LBS | BODY MASS INDEX: 28.32 KG/M2 | DIASTOLIC BLOOD PRESSURE: 66 MMHG | SYSTOLIC BLOOD PRESSURE: 109 MMHG | HEIGHT: 65 IN | OXYGEN SATURATION: 100 % | HEART RATE: 73 BPM

## 2021-03-12 DIAGNOSIS — Z00.00 ROUTINE GENERAL MEDICAL EXAMINATION AT A HEALTH CARE FACILITY: Primary | ICD-10-CM

## 2021-03-12 PROCEDURE — 99395 PREV VISIT EST AGE 18-39: CPT | Performed by: FAMILY MEDICINE

## 2021-03-12 ASSESSMENT — ENCOUNTER SYMPTOMS
DIARRHEA: 0
FEVER: 0
MYALGIAS: 0
HEARTBURN: 0
HEMATURIA: 0
NERVOUS/ANXIOUS: 0
EYE PAIN: 0
PARESTHESIAS: 0
DIZZINESS: 0
WEAKNESS: 0
CHILLS: 0
ARTHRALGIAS: 0
HEADACHES: 0
SHORTNESS OF BREATH: 0
ABDOMINAL PAIN: 0
CONSTIPATION: 0
DYSURIA: 0
NAUSEA: 0
HEMATOCHEZIA: 0
BREAST MASS: 0
JOINT SWELLING: 0
FREQUENCY: 0
SORE THROAT: 0
PALPITATIONS: 0
COUGH: 0

## 2021-03-12 ASSESSMENT — MIFFLIN-ST. JEOR: SCORE: 1446.99

## 2021-03-12 ASSESSMENT — PAIN SCALES - GENERAL: PAINLEVEL: NO PAIN (0)

## 2021-03-12 NOTE — PROGRESS NOTES
SUBJECTIVE:   CC: Brittany Carver is an 40 year old woman who presents for preventive health visit.       Patient has been advised of split billing requirements and indicates understanding: Yes  Healthy Habits:     Getting at least 3 servings of Calcium per day:  Yes    Bi-annual eye exam:  Yes    Dental care twice a year:  Yes    Sleep apnea or symptoms of sleep apnea:  None    Diet:  Regular (no restrictions)    Frequency of exercise:  2-3 days/week    Duration of exercise:  30-45 minutes    Taking medications regularly:  Yes    Medication side effects:  None    PHQ-2 Total Score: 0    Additional concerns today:  No              Today's PHQ-2 Score:   PHQ-2 ( 1999 [employer]) 3/12/2021   Q1: Little interest or pleasure in doing things 0   Q2: Feeling down, depressed or hopeless 0   PHQ-2 Score 0   Q1: Little interest or pleasure in doing things Not at all   Q2: Feeling down, depressed or hopeless Not at all   PHQ-2 Score 0       Abuse: Current or Past (Physical, Sexual or Emotional) - No  Do you feel safe in your environment? Yes    Have you ever done Advance Care Planning? (For example, a Health Directive, POLST, or a discussion with a medical provider or your loved ones about your wishes): No, advance care planning information given to patient to review.  Advanced care planning was discussed at today's visit.    Social History     Tobacco Use     Smoking status: Never Smoker     Smokeless tobacco: Never Used   Substance Use Topics     Alcohol use: No     If you drink alcohol do you typically have >3 drinks per day or >7 drinks per week? No    Alcohol Use 3/12/2021   Prescreen: >3 drinks/day or >7 drinks/week? No   Prescreen: >3 drinks/day or >7 drinks/week? -         Reviewed orders with patient.  Reviewed health maintenance and updated orders accordingly - Yes      Breast CA Risk Screening:  Breast CA Risk Assessment (FHS-7) 3/12/2021   Do you have a family history of breast, colon, or ovarian cancer? Yes    Did any of your first-degree relatives have breast or ovarian cancer? Yes   Did any of your relatives have bilateral breast cancer? Unknown   Did any man in your family have breast cancer? No   Did any woman in your family have breast and ovarian cancer? Yes   Did any woman in your family have breast cancer before age 50 y? No   Do you have 2 or more relatives with breast and/or ovarian cancer? Yes   Do you have 2 or more relatives with breast and/or bowel cancer? Yes           Pertinent mammograms are reviewed under the imaging tab.    History of abnormal Pap smear:   PAP / HPV Latest Ref Rng & Units 11/26/2019 2/19/2013 1/5/2010   PAP - NIL NIL NIL   HPV 16 DNA NEG:Negative Negative - -   HPV 18 DNA NEG:Negative Negative - -   OTHER HR HPV NEG:Negative Negative - -     Reviewed and updated as needed this visit by clinical staff  Tobacco  Allergies  Meds   Med Hx  Surg Hx  Fam Hx  Soc Hx        Reviewed and updated as needed this visit by Provider  Tobacco                   Review of Systems   Constitutional: Negative for chills and fever.   HENT: Negative for congestion, ear pain, hearing loss and sore throat.    Eyes: Negative for pain and visual disturbance.   Respiratory: Negative for cough and shortness of breath.    Cardiovascular: Negative for chest pain, palpitations and peripheral edema.   Gastrointestinal: Negative for abdominal pain, constipation, diarrhea, heartburn, hematochezia and nausea.   Breasts:  Negative for tenderness, breast mass and discharge.   Genitourinary: Negative for dysuria, frequency, genital sores, hematuria, pelvic pain, urgency, vaginal bleeding and vaginal discharge.   Musculoskeletal: Negative for arthralgias, joint swelling and myalgias.   Skin: Negative for rash.   Neurological: Negative for dizziness, weakness, headaches and paresthesias.   Psychiatric/Behavioral: Negative for mood changes. The patient is not nervous/anxious.           OBJECTIVE:   /66   Pulse  "73   Ht 1.651 m (5' 5\")   Wt 77.1 kg (170 lb)   LMP 2021   SpO2 100%   BMI 28.29 kg/m    Physical Exam      Diagnostic Test Results:  Labs reviewed in Epic    ASSESSMENT/PLAN:       ICD-10-CM    1. Routine general medical examination at a health care facility  Z00.00        Patient has been advised of split billing requirements and indicates understanding: Yes  COUNSELING:  Reviewed preventive health counseling, as reflected in patient instructions       Regular exercise       Healthy diet/nutrition       Vision screening       Contraception       Family planning       Osteoporosis prevention/bone health       Consider Hep C screening for all patients one time for ages 18-79 years       HIV screeninx in teen years, 1x in adult years, and at intervals if high risk    Estimated body mass index is 28.29 kg/m  as calculated from the following:    Height as of this encounter: 1.651 m (5' 5\").    Weight as of this encounter: 77.1 kg (170 lb).    Weight management plan: Discussed healthy diet and exercise guidelines    She reports that she has never smoked. She has never used smokeless tobacco.      Counseling Resources:  ATP IV Guidelines  Pooled Cohorts Equation Calculator  Breast Cancer Risk Calculator  BRCA-Related Cancer Risk Assessment: FHS-7 Tool  FRAX Risk Assessment  ICSI Preventive Guidelines  Dietary Guidelines for Americans,   [employer]'s MyPlate  ASA Prophylaxis  Lung CA Screening    Yaya Richard MD  [facility]  --------------------------------------------------------------------------------------------------------------------------------------  SUBJECTIVE:  Brittany Carver is a 40 year old female who presents to the clinic today for a routine physical exam.    The patient's last physical was 19.    Cholesterol   Date Value Ref Range Status   2021 167 <200 mg/dL Final   2019 170 <200 mg/dL Final     HDL Cholesterol   Date Value Ref Range Status "   03/05/2021 67 >49 mg/dL Final   11/19/2019 60 >49 mg/dL Final     LDL Cholesterol Calculated   Date Value Ref Range Status   03/05/2021 85 <100 mg/dL Final     Comment:     Desirable:       <100 mg/dl   11/19/2019 86 <100 mg/dL Final     Comment:     Desirable:       <100 mg/dl     Triglycerides   Date Value Ref Range Status   03/05/2021 74 <150 mg/dL Final     Comment:     Fasting specimen   11/19/2019 122 <150 mg/dL Final     Comment:     Fasting specimen     Cholesterol/HDL Ratio   Date Value Ref Range Status   08/30/2010 2.9 0.0 - 5.0 Final     The patient's last fasting lipid panel was done 1 weeks ago and the results are listed above.      The ASCVD Risk score (Earl MILLER Jr., et al., 2013) failed to calculate for the following reasons:    The 2013 ASCVD risk score is only valid for ages 40 to 79   0.3% per the [place] ATHEROSCLEROTIC CARDIOVASCULAR DISEASE marika    Risk Enhancers:  Family history of premature ASCVD- No  LDL >159- No  Chronic kidney disease- No  Metabolic Syndrome- No  Premature menopause- No  Inflammatory conditions (RA, psoriasis, HIV)- No  SE  Ancestry- No  Triglycerides >174- No        The patient reports that she has never been treated for high blood pressure.    The patient reports that she does not take a daily aspirin.        Lab Results   Component Value Date    HCVAB Nonreactive 03/05/2021      The patient reports that she has been screened for Hepatitis C    (Screen all baby boomers once per CDC-- the generation born from 1945 through 1965 and per USPTF screen age 19 to 79 especially younger people who have used IV drugs)  She would not like to have an Hepatitis C test today    No results found for: HIAGAB  The patient reports that she has been screened for HIV   (Screen all 15 to 64 years old)  She would not like to have an HIV test today              Immunization History   Administered Date(s) Administered     TD (ADULT, 7+) 04/01/2004     The patient has not started the Gardasil  vaccination series.  The patient's believes that her last tetanus shot was given 17 year(s) ago.   The patient believes that she has not had a Shingrix in the past  The patient believes that she has not had a PPSV23 in the past.  The patient believes that she has not had a PCV13 in the past.  The patient believes that she has not had a seasonal flu vaccination this fall or winter.  The patient would like to have a no vaccinations today      No results found for this or any previous visit.]   The patient denies a family history of colon cancer.  The patient reports that she has had a colonoscopy.      The patient reports a family history diabetes in her grandmother .    Any new diagnosis of family breast, ovarian, or bowel cancer? Yes       Breast CA Risk Assessment (FHS-7) 3/12/2021   Do you have a family history of breast, colon, or ovarian cancer? Yes   Did any of your first-degree relatives have breast or ovarian cancer? Yes   Did any of your relatives have bilateral breast cancer? Unknown   Did any man in your family have breast cancer? No   Did any woman in your family have breast and ovarian cancer? Yes   Did any woman in your family have breast cancer before age 50 y? No   Do you have 2 or more relatives with breast and/or ovarian cancer? Yes   Do you have 2 or more relatives with breast and/or bowel cancer? Yes         The patient reports that she does performs a self breast exam monthly.  The patient reports a family history of breast cancer in her mom and grandmother .  The patient does not want to have a mammogram done.  The patient does not want to have a Pap smear done.  She reports that she has not had an abnormal pap smear  The patient is not interested in contraception. She and her partner are using condoms  The patient reports that she eats or drinks 3 servings of dairy products per day. The patient reports that she has not had a bone density scan.       (screen women 65+ or 50+ with risk factors)      The patient reports that she has dental appointments approximately every 6 months.  The patient reports that she  has an eye examination approximately every 1.0 year(s).        Do you currently smoke? No  How many years have you smoked? 0  How many packs per day did you smoke on average? N/A  (if more than 30 pack year history and the patient is age 55-80 consider ordering an annual low dose radiation lung CT to screen for cancer)  (Do not order if patient has quit more than 15 years ago or has a health condition that limits life expectancy or could not tolerate curative lung surgery)  Are you interested having a lung CT to screen for lung cancer? N/A                Past Medical History:   Diagnosis Date     NO ACTIVE PROBLEMS        Past Surgical History:   Procedure Laterality Date     C APPENDECTOMY  1993     TONSILLECTOMY  2001    due to obstruction       Family History   Problem Relation Age of Onset     Breast Cancer Mother 55        age 55     Thyroid Disease Mother         late in life     Thyroid Disease Maternal Grandmother         late in life     Alzheimer Disease Maternal Grandfather      Thyroid Disease Maternal Grandfather      Diabetes Paternal Grandmother         diet and exercise controlled     Thyroid Disease Maternal Aunt      Thyroid Disease Maternal Uncle      Cancer Paternal Grandfather      Thyroid Disease Other      C.A.D. No family hx of      Hypertension No family hx of      Cancer - colorectal No family hx of      Anesthesia Reaction No family hx of      Blood Disease No family hx of        Social History     Socioeconomic History     Marital status:      Spouse name: Not on file     Number of children: Not on file     Years of education: Not on file     Highest education level: Not on file   Occupational History     Not on file   Social Needs     Financial resource strain: Not on file     Food insecurity     Worry: Not on file     Inability: Not on file     Transportation  "needs     Medical: Not on file     Non-medical: Not on file   Tobacco Use     Smoking status: Never Smoker     Smokeless tobacco: Never Used   Substance and Sexual Activity     Alcohol use: No     Drug use: No     Sexual activity: Yes     Partners: Male   Lifestyle     Physical activity     Days per week: Not on file     Minutes per session: Not on file     Stress: Not on file   Relationships     Social connections     Talks on phone: Not on file     Gets together: Not on file     Attends Yazdanism service: Not on file     Active member of club or organization: Not on file     Attends meetings of clubs or organizations: Not on file     Relationship status: Not on file     Intimate partner violence     Fear of current or ex partner: Not on file     Emotionally abused: Not on file     Physically abused: Not on file     Forced sexual activity: Not on file   Other Topics Concern     Parent/sibling w/ CABG, MI or angioplasty before 65F 55M? Not Asked   Social History Narrative     Not on file       Current Outpatient Medications   Medication Sig Dispense Refill     levothyroxine (SYNTHROID/LEVOTHROID) 50 MCG tablet Take 1 tablet (50 mcg) by mouth daily 30 tablet 2     MULTI-VITAMIN OR 3 tablets once a day           PHYSICAL EXAMINATION:  Blood pressure 109/66, pulse 73, height 1.651 m (5' 5\"), weight 77.1 kg (170 lb), last menstrual period 03/08/2021, SpO2 100 %, unknown if currently breastfeeding.  General appearance - healthy, alert and no distress  Skin - Skin color, texture, turgor normal. No rashes or lesions.  Head - Normocephalic. No masses, lesions, tenderness or abnormalities  Eyes - conjunctivae/corneas clear. PERRL, EOM's intact. Fundi benign  Ears - External ears normal. Canals clear. TM's normal.  Nose/Sinuses - Nares normal. Septum midline. Mucosa normal. No drainage or sinus tenderness.  Oropharynx - Lips, mucosa, and tongue normal. Teeth and gums normal.  Neck - Neck supple. No adenopathy. Thyroid " symmetric, normal size,  Lungs - Percussion normal. Good diaphragmatic excursion. Lungs clear  Heart - PMI normal. No lifts, heaves, or thrills. RRR. No murmurs, clicks gallops or rub  Breasts - Breasts normal to inspection and palpation. Axillae negative  Abdomen - Abdomen soft, non-tender. BS normal. No masses, organomegaly  Extremities - Extremities normal. No deformities, edema, or skin discoloration.  Musculoskeletal - Spine ROM normal. Muscular strength intact.  Peripheral pulses - radial=4/4, femoral=4/4, popliteal=4/4, dorsalis pedis=4/4,  Neuro - Gait normal. Reflexes normal and symmetric. Sensation grossly WNL.        Orders Only on 03/05/2021   Component Date Value Ref Range Status     Cholesterol 03/05/2021 167  <200 mg/dL Final     Triglycerides 03/05/2021 74  <150 mg/dL Final    Fasting specimen     HDL Cholesterol 03/05/2021 67  >49 mg/dL Final     LDL Cholesterol Calculated 03/05/2021 85  <100 mg/dL Final    Desirable:       <100 mg/dl     Non HDL Cholesterol 03/05/2021 100  <130 mg/dL Final     Sodium 03/05/2021 139  133 - 144 mmol/L Final     Potassium 03/05/2021 4.1  3.4 - 5.3 mmol/L Final     Chloride 03/05/2021 109  94 - 109 mmol/L Final     Carbon Dioxide 03/05/2021 26  20 - 32 mmol/L Final     Anion Gap 03/05/2021 4  3 - 14 mmol/L Final     Glucose 03/05/2021 85  70 - 99 mg/dL Final    Fasting specimen     Urea Nitrogen 03/05/2021 12  7 - 30 mg/dL Final     Creatinine 03/05/2021 0.72  0.52 - 1.04 mg/dL Final     GFR Estimate 03/05/2021 >90  >60 mL/min/[1.73_m2] Final    Comment: Non  GFR Calc  Starting 12/18/2018, serum creatinine based estimated GFR (eGFR) will be   calculated using the Chronic Kidney Disease Epidemiology Collaboration   (CKD-EPI) equation.       GFR Estimate If Black 03/05/2021 >90  >60 mL/min/[1.73_m2] Final    Comment:  GFR Calc  Starting 12/18/2018, serum creatinine based estimated GFR (eGFR) will be   calculated using the Chronic Kidney  Disease Epidemiology Collaboration   (CKD-EPI) equation.       Calcium 03/05/2021 8.3* 8.5 - 10.1 mg/dL Final     Bilirubin Total 03/05/2021 0.5  0.2 - 1.3 mg/dL Final     Albumin 03/05/2021 3.4  3.4 - 5.0 g/dL Final     Protein Total 03/05/2021 7.1  6.8 - 8.8 g/dL Final     Alkaline Phosphatase 03/05/2021 71  40 - 150 U/L Final     ALT 03/05/2021 22  0 - 50 U/L Final     AST 03/05/2021 19  0 - 45 U/L Final     TSH 03/05/2021 7.55* 0.40 - 4.00 mU/L Final     Hepatitis C Antibody 03/05/2021 Nonreactive  NR^Nonreactive Final    Comment: Assay performance characteristics have not been established for newborns,   infants, and children       T4 Free 03/05/2021 0.72* 0.76 - 1.46 ng/dL Final       ASSESSMENT:    ICD-10-CM    1. Routine general medical examination at a health care facility  Z00.00        Well-Adult Physical Exam.  Health Maintenance Due   Topic Date Due     ADVANCE CARE PLANNING  Never done     DTAP/TDAP/TD IMMUNIZATION (2 - Td) 04/01/2014     INFLUENZA VACCINE (1) Never done     Health Maintenance   Topic Date Due     ADVANCE CARE PLANNING  Never done     DTAP/TDAP/TD IMMUNIZATION (2 - Td) 04/01/2014     INFLUENZA VACCINE (1) Never done     PREVENTIVE CARE VISIT  03/12/2022     HPV TEST  11/26/2024     PAP  11/26/2024     HEPATITIS C SCREENING  Completed     HIV SCREENING  Completed     PHQ-2  Completed     Pneumococcal Vaccine: Pediatrics (0 to 5 Years) and At-Risk Patients (6 to 64 Years)  Aged Out     IPV IMMUNIZATION  Aged Out     MENINGITIS IMMUNIZATION  Aged Out     HEPATITIS B IMMUNIZATION  Aged Out         HEALTH CARE MAINTENENCE: The recommended screening tests and vaccinatons for this patient have been discussed as above.  The appropriate tests and vaccinations  have been ordered or declined by the patient. Please see the orders in EPIC.The patient specifically declines: all vaccination(s)     Immunization Status:   delayed    Patient Active Problem List   Diagnosis     CARDIOVASCULAR SCREENING;  LDL GOAL LESS THAN 160     Thyroid function study abnormality     Encounter for supervision of other normal pregnancy     Rh negative status during pregnancy     Low-lying placenta     Vaccination refused by patient        ATP III Guidelines  ICSI Preventive Guidelines    PLAN:  Genetic counseling advised regarding breast cancer but the patient declined at this time.       HIV testing was discussed but the pt declined  Tetanus booster recommended  Breast self exam demonstrated and recommended  Mammogram recommended at age 40  Pap smear was not recommended per the ACOG guidelines  Discussed calcium intake, vitamins and supplements. Recommended 1200 mg of calcium daily  Weight loss through diet and exercise was recommended  Sunscreen use was recommended especially in the area of tatoos  Recommended dental exams every 6 months  Recommended eye exam every 1-2 years  Follow up in 1 year for the next preventative medical visit      Osteoporosis TX indications:  Post menopausal women with a hip or vertebral fracture  Any T score less than or equal to -2.5  Any T score between -1.0 and -2.5 and a 10 year hip fracture probability > or = to 3%  Any T score between -1.0 and -2.5 and a 10 year probability or a major osteoporosis-related fracture  > or = 20% based on FRAX score  [email or web address]              Body mass index is 28.29 kg/m .

## 2021-03-12 NOTE — NURSING NOTE
"Chief Complaint   Patient presents with     Physical     Health Maintenance     PHQ2, Adv Dir       Initial /80   Pulse 73   Ht 1.651 m (5' 5\")   Wt 77.1 kg (170 lb)   LMP 03/08/2021   SpO2 100%   BMI 28.29 kg/m   Estimated body mass index is 28.29 kg/m  as calculated from the following:    Height as of this encounter: 1.651 m (5' 5\").    Weight as of this encounter: 77.1 kg (170 lb).  Medication Reconciliation: complete  Kiesha Carroll, GARRETT  "

## 2021-06-22 DIAGNOSIS — E03.9 HYPOTHYROIDISM, UNSPECIFIED TYPE: ICD-10-CM

## 2021-06-22 LAB — TSH SERPL DL<=0.005 MIU/L-ACNC: 1.19 MU/L (ref 0.4–4)

## 2021-06-22 PROCEDURE — 36415 COLL VENOUS BLD VENIPUNCTURE: CPT | Performed by: FAMILY MEDICINE

## 2021-06-22 PROCEDURE — 84443 ASSAY THYROID STIM HORMONE: CPT | Performed by: FAMILY MEDICINE

## 2021-06-23 NOTE — RESULT ENCOUNTER NOTE
Brittany,  I have reviewed the results of the laboratory tests that we recently ordered. All of the lab work performed was normal or considered normal for you.  Sincerely,   Yaya Richard MD

## 2021-07-28 DIAGNOSIS — E03.9 HYPOTHYROIDISM, UNSPECIFIED TYPE: ICD-10-CM

## 2021-07-28 RX ORDER — LEVOTHYROXINE SODIUM 50 UG/1
TABLET ORAL
Qty: 90 TABLET | Refills: 2 | Status: SHIPPED | OUTPATIENT
Start: 2021-07-28 | End: 2022-05-09

## 2021-09-26 ENCOUNTER — HEALTH MAINTENANCE LETTER (OUTPATIENT)
Age: 41
End: 2021-09-26

## 2021-11-01 ENCOUNTER — OFFICE VISIT (OUTPATIENT)
Dept: FAMILY MEDICINE | Facility: CLINIC | Age: 41
End: 2021-11-01
Payer: COMMERCIAL

## 2021-11-01 ENCOUNTER — ANCILLARY PROCEDURE (OUTPATIENT)
Dept: GENERAL RADIOLOGY | Facility: CLINIC | Age: 41
End: 2021-11-01
Attending: FAMILY MEDICINE
Payer: COMMERCIAL

## 2021-11-01 VITALS
SYSTOLIC BLOOD PRESSURE: 134 MMHG | HEART RATE: 77 BPM | WEIGHT: 166 LBS | DIASTOLIC BLOOD PRESSURE: 80 MMHG | OXYGEN SATURATION: 99 % | TEMPERATURE: 98.3 F | BODY MASS INDEX: 27.62 KG/M2

## 2021-11-01 DIAGNOSIS — M25.571 PAIN IN JOINT, ANKLE AND FOOT, RIGHT: ICD-10-CM

## 2021-11-01 DIAGNOSIS — R55 SYNCOPE, UNSPECIFIED SYNCOPE TYPE: ICD-10-CM

## 2021-11-01 DIAGNOSIS — R55 VASOVAGAL SYNCOPE: ICD-10-CM

## 2021-11-01 DIAGNOSIS — M25.571 PAIN IN JOINT, ANKLE AND FOOT, RIGHT: Primary | ICD-10-CM

## 2021-11-01 LAB
ERYTHROCYTE [DISTWIDTH] IN BLOOD BY AUTOMATED COUNT: 15.7 % (ref 10–15)
HCT VFR BLD AUTO: 40.5 % (ref 35–47)
HGB BLD-MCNC: 12.8 G/DL (ref 11.7–15.7)
MCH RBC QN AUTO: 26.6 PG (ref 26.5–33)
MCHC RBC AUTO-ENTMCNC: 31.6 G/DL (ref 31.5–36.5)
MCV RBC AUTO: 84 FL (ref 78–100)
PLATELET # BLD AUTO: 317 10E3/UL (ref 150–450)
RBC # BLD AUTO: 4.82 10E6/UL (ref 3.8–5.2)
WBC # BLD AUTO: 7.1 10E3/UL (ref 4–11)

## 2021-11-01 PROCEDURE — 73610 X-RAY EXAM OF ANKLE: CPT | Mod: RT | Performed by: RADIOLOGY

## 2021-11-01 PROCEDURE — 99213 OFFICE O/P EST LOW 20 MIN: CPT | Performed by: FAMILY MEDICINE

## 2021-11-01 PROCEDURE — 36415 COLL VENOUS BLD VENIPUNCTURE: CPT | Performed by: FAMILY MEDICINE

## 2021-11-01 PROCEDURE — 85027 COMPLETE CBC AUTOMATED: CPT | Performed by: FAMILY MEDICINE

## 2021-11-01 RX ORDER — OMEGA-3 FATTY ACIDS/FISH OIL 300-1000MG
200 CAPSULE ORAL EVERY 4 HOURS PRN
COMMUNITY

## 2021-11-01 ASSESSMENT — PAIN SCALES - GENERAL: PAINLEVEL: NO PAIN (0)

## 2021-11-01 NOTE — PROGRESS NOTES
SUBJECTIVE:  Brittany Carver is a 41 year old female who scheduled an appointment to discuss the following issues:    Yes she was walking in her house and and her right foot got stuck on something and her foot turned in underneath her.     She put it up and elevated it  Then applied ice and an ace ankle sleeve        This am she got up from bed and   She walked to the bathroom with a walkwer   She switched to crutches and got to the sink  As she stood there   She felt nauseated and hot and got tunnel vision. She then remembers starting to go to her side and then waking up on the floor.     Her  was there and caught her'  He layed her down on the floor and she was responsive in less than 5 seconds.     She reports that she feels ok at the present time        Past Medical, social, family histories, medications, and allergies reviewed and updated   ROS: other than that noted above all other review of systems was negative    ROS:       Current Outpatient Medications:      ibuprofen (ADVIL/MOTRIN) 200 MG capsule, Take 200 mg by mouth every 4 hours as needed for fever, Disp: , Rfl:      levothyroxine (SYNTHROID/LEVOTHROID) 50 MCG tablet, TAKE ONE TABLET BY MOUTH ONCE DAILY, Disp: 90 tablet, Rfl: 2     MULTI-VITAMIN OR, 3 tablets once a day, Disp: , Rfl:     OBJECTIVE:  /80   Pulse 77   Temp 98.3  F (36.8  C) (Tympanic)   Wt 75.3 kg (166 lb)   SpO2 99%   BMI 27.62 kg/m      EXAM:  GENERAL APPEARANCE: healthy, alert and no distress  EYES: EOMI,  PERRL  HENT: ear canals and TM's normal and nose and mouth without ulcers or lesions  NECK: no adenopathy, no asymmetry, masses, or scars and thyroid normal to palpation  RESP: lungs clear to auscultation - no rales, rhonchi or wheezes  CV: regular rates and rhythm, normal S1 S2, no S3 or S4 and no murmur, click or rub -  ABDOMEN:  soft, nontender, no HSM or masses and bowel sounds normal  MS: extremities normal- no gross deformities noted, no evidence of  inflammation in joints, FROM in all extremities.    SKIN: no suspicious lesions or rashes  PSYCH:  mentation appears normal and affect normal/bright  Ankle Exam (right):  Inspection:swelling around the lateral malleolus  Palpation:tender over the lateral malleolus  Good doralis pedis and posterior tibial pulses  Neurovascularly Intact Distally.   Special Tests:  Positive: Pain with passive inversion  Negative: Syndesmosis squeeze test  Anterior drawer sign           Results for orders placed or performed in visit on 11/01/21   CBC with Platelets       Status: Abnormal   Result Value Ref Range    WBC Count 7.1 4.0 - 11.0 10e3/uL    RBC Count 4.82 3.80 - 5.20 10e6/uL    Hemoglobin 12.8 11.7 - 15.7 g/dL    Hematocrit 40.5 35.0 - 47.0 %    MCV 84 78 - 100 fL    MCH 26.6 26.5 - 33.0 pg    MCHC 31.6 31.5 - 36.5 g/dL    RDW 15.7 (H) 10.0 - 15.0 %    Platelet Count 317 150 - 450 10e3/uL         ASSESSMENT/PLAN:    No diagnosis found.    X-Ray:   offical reading pending  normal mortise, no loose bodies, no fractures seen  Study Result    Narrative & Impression   XR ANKLE RIGHT G/E 3 VIEWS 11/1/2021 2:00 PM      HISTORY: Pain in joint, ankle and foot, right                                                                      IMPRESSION: Negative exam.     PATRICIO GALICIA MD         SYSTEM ID:  [id number]         ASSESSMENT:        (R55) Vasovagal syncope  Comment: normal cbc   Plan: recommend(ed) good hydration and avoiding ambulating while is significant(ly) pain        Inversion ankle sprain    PLAN:  The patient was prescribed ibuprofen 600 mg every 6 hours as needed. .  I recommended that the patient ice the ankle with a gel pack wrapped around the ankle with a Ace wrap 15 minutes on and 15 minutes off as much as possible for the next 72 hours. I encouraged elevation while there is swelling. The patient and her  and I discussed the absolute importance of range of motion exercises and strengthening exercises despite  the immediate discomfort that they may cause. I demonstrated the [place] exercises and the alphabet exercises and had the patient try this.

## 2021-11-01 NOTE — NURSING NOTE
"Chief Complaint   Patient presents with     Syncope     this morning, feeling better now, felt nausea right before it happened     Musculoskeletal Problem     hurt her ankle yesterday, right       Initial /80   Pulse 77   Temp 98.3  F (36.8  C) (Tympanic)   Wt 75.3 kg (166 lb)   SpO2 99%   BMI 27.62 kg/m   Estimated body mass index is 27.62 kg/m  as calculated from the following:    Height as of 3/12/21: 1.651 m (5' 5\").    Weight as of this encounter: 75.3 kg (166 lb).  Medication Reconciliation: complete  Kiesha Carroll, GARRETT  "

## 2022-05-08 ENCOUNTER — HEALTH MAINTENANCE LETTER (OUTPATIENT)
Age: 42
End: 2022-05-08

## 2022-06-14 ENCOUNTER — OFFICE VISIT (OUTPATIENT)
Dept: FAMILY MEDICINE | Facility: CLINIC | Age: 42
End: 2022-06-14
Payer: COMMERCIAL

## 2022-06-14 VITALS
TEMPERATURE: 97.9 F | BODY MASS INDEX: 28.32 KG/M2 | SYSTOLIC BLOOD PRESSURE: 109 MMHG | HEIGHT: 65 IN | RESPIRATION RATE: 16 BRPM | OXYGEN SATURATION: 100 % | HEART RATE: 69 BPM | DIASTOLIC BLOOD PRESSURE: 70 MMHG | WEIGHT: 170 LBS

## 2022-06-14 DIAGNOSIS — Z12.31 ENCOUNTER FOR SCREENING MAMMOGRAM FOR BREAST CANCER: ICD-10-CM

## 2022-06-14 DIAGNOSIS — Z00.00 ROUTINE GENERAL MEDICAL EXAMINATION AT A HEALTH CARE FACILITY: Primary | ICD-10-CM

## 2022-06-14 DIAGNOSIS — Z80.3 FAMILY HISTORY OF MALIGNANT NEOPLASM OF BREAST: ICD-10-CM

## 2022-06-14 DIAGNOSIS — Z83.3 FAMILY HISTORY OF DIABETES MELLITUS: ICD-10-CM

## 2022-06-14 DIAGNOSIS — E03.9 HYPOTHYROIDISM, UNSPECIFIED TYPE: ICD-10-CM

## 2022-06-14 LAB
HBA1C MFR BLD: 5.7 % (ref 0–5.6)
T4 FREE SERPL-MCNC: 0.8 NG/DL (ref 0.76–1.46)
TSH SERPL DL<=0.005 MIU/L-ACNC: 6.24 MU/L (ref 0.4–4)

## 2022-06-14 PROCEDURE — 83036 HEMOGLOBIN GLYCOSYLATED A1C: CPT | Performed by: FAMILY MEDICINE

## 2022-06-14 PROCEDURE — 99396 PREV VISIT EST AGE 40-64: CPT | Performed by: FAMILY MEDICINE

## 2022-06-14 PROCEDURE — 84439 ASSAY OF FREE THYROXINE: CPT | Performed by: FAMILY MEDICINE

## 2022-06-14 PROCEDURE — 36415 COLL VENOUS BLD VENIPUNCTURE: CPT | Performed by: FAMILY MEDICINE

## 2022-06-14 PROCEDURE — 99213 OFFICE O/P EST LOW 20 MIN: CPT | Mod: 25 | Performed by: FAMILY MEDICINE

## 2022-06-14 PROCEDURE — 84443 ASSAY THYROID STIM HORMONE: CPT | Performed by: FAMILY MEDICINE

## 2022-06-14 ASSESSMENT — ENCOUNTER SYMPTOMS
HEMATOCHEZIA: 0
SORE THROAT: 0
JOINT SWELLING: 0
WEAKNESS: 0
SHORTNESS OF BREATH: 0
HEARTBURN: 0
NAUSEA: 0
EYE PAIN: 0
PALPITATIONS: 0
COUGH: 0
NERVOUS/ANXIOUS: 0
FEVER: 0
DYSURIA: 0
ABDOMINAL PAIN: 0
PARESTHESIAS: 0
CHILLS: 0
HEMATURIA: 0
DIZZINESS: 0
MYALGIAS: 0
ARTHRALGIAS: 0
DIARRHEA: 0
FREQUENCY: 0
CONSTIPATION: 0
HEADACHES: 0

## 2022-06-14 ASSESSMENT — PAIN SCALES - GENERAL: PAINLEVEL: NO PAIN (0)

## 2022-06-14 NOTE — PROGRESS NOTES
SUBJECTIVE:   CC: Brittany Carver is an 41 year old woman who presents for preventive health visit.       Patient has been advised of split billing requirements and indicates understanding: Yes  Healthy Habits:     Getting at least 3 servings of Calcium per day:  Yes    Bi-annual eye exam:  Yes    Dental care twice a year:  Yes    Sleep apnea or symptoms of sleep apnea:  None    Diet:  Regular (no restrictions)    Frequency of exercise:  2-3 days/week    Duration of exercise:  30-45 minutes    Taking medications regularly:  Yes    Medication side effects:  None    PHQ-2 Total Score: 0    Additional concerns today:  No              Today's PHQ-2 Score:   PHQ-2 ( 1999 Pf[employer]) 6/14/2022   Q1: Little interest or pleasure in doing things 0   Q2: Feeling down, depressed or hopeless 0   PHQ-2 Score 0   PHQ-2 Total Score (12-17 Years)- Positive if 3 or more points; Administer PHQ-A if positive -   Q1: Little interest or pleasure in doing things Not at all   Q2: Feeling down, depressed or hopeless Not at all   PHQ-2 Score 0       Abuse: Current or Past (Physical, Sexual or Emotional) - No  Do you feel safe in your environment? Yes        Social History     Tobacco Use     Smoking status: Never Smoker     Smokeless tobacco: Never Used   Substance Use Topics     Alcohol use: No     If you drink alcohol do you typically have >3 drinks per day or >7 drinks per week? No    Alcohol Use 6/14/2022   Prescreen: >3 drinks/day or >7 drinks/week? No   Prescreen: >3 drinks/day or >7 drinks/week? -       Reviewed orders with patient.  Reviewed health maintenance and updated orders accordingly -       Breast Cancer Screening:    FHS-7:   Breast CA Risk Assessment (FHS-7) 3/12/2021 6/14/2022   Did any of your first-degree relatives have breast or ovarian cancer? Yes Yes   Did any of your relatives have bilateral breast cancer? Unknown No   Did any man in your family have breast cancer? No No   Did any woman in your family have  "breast and ovarian cancer? Yes Yes   Did any woman in your family have breast cancer before age 50 y? No No   Do you have 2 or more relatives with breast and/or ovarian cancer? Yes Yes   Do you have 2 or more relatives with breast and/or bowel cancer? Yes Yes         Pertinent mammograms are reviewed under the imaging tab.    History of abnormal Pap smear:   PAP / HPV Latest Ref Rng & Units 11/26/2019 2/19/2013 1/5/2010   PAP (Historical) - NIL NIL NIL   HPV16 NEG:Negative Negative - -   HPV18 NEG:Negative Negative - -   HRHPV NEG:Negative Negative - -     Reviewed and updated as needed this visit by clinical staff   Tobacco  Allergies  Meds   Med Hx  Surg Hx  Fam Hx  Soc Hx          Reviewed and updated as needed this visit by Provider   Tobacco                     Review of Systems   Constitutional: Negative for chills and fever.   HENT: Negative for congestion, ear pain, hearing loss and sore throat.    Eyes: Negative for pain and visual disturbance.   Respiratory: Negative for cough and shortness of breath.    Cardiovascular: Negative for chest pain, palpitations and peripheral edema.   Gastrointestinal: Negative for abdominal pain, constipation, diarrhea, heartburn, hematochezia and nausea.   Genitourinary: Negative for dysuria, frequency, genital sores, hematuria and urgency.   Musculoskeletal: Negative for arthralgias, joint swelling and myalgias.   Skin: Negative for rash.   Neurological: Negative for dizziness, weakness, headaches and paresthesias.   Psychiatric/Behavioral: Negative for mood changes. The patient is not nervous/anxious.           OBJECTIVE:   /70   Pulse 69   Temp 97.9  F (36.6  C) (Tympanic)   Resp 16   Ht 1.651 m (5' 5\")   Wt 77.1 kg (170 lb)   LMP 06/09/2022   SpO2 100%   BMI 28.29 kg/m    Physical Exam      Diagnostic Test Results:  Labs reviewed in Epic    ASSESSMENT/PLAN:       ICD-10-CM    1. Routine general medical examination at a health care facility  Z00.00  " "  2. Family history of diabetes mellitus  Z83.3 Hemoglobin A1c     Hemoglobin A1c   3. Family history of malignant neoplasm of breast  Z80.3 MA Screening Digital Bilateral   4. Encounter for screening mammogram for breast cancer  Z12.31 MA Screening Digital Bilateral   5. Hypothyroidism, unspecified type  E03.9 TSH with free T4 reflex     TSH with free T4 reflex       Patient has been advised of split billing requirements and indicates understanding: Yes    COUNSELING:  Reviewed preventive health counseling, as reflected in patient instructions       Regular exercise       Vision screening       Aspirin prophylaxis       Alcohol Use       Contraception       Family planning       Osteoporosis prevention/bone health       Consider Hep C screening for all patients one time for ages 18-79 years       HIV screeninx in teen years, 1x in adult years, and at intervals if high risk    Estimated body mass index is 28.29 kg/m  as calculated from the following:    Height as of this encounter: 1.651 m (5' 5\").    Weight as of this encounter: 77.1 kg (170 lb).    Weight management plan: Discussed healthy diet and exercise guidelines    She reports that she has never smoked. She has never used smokeless tobacco.      Counseling Resources:  ATP IV Guidelines  Pooled Cohorts Equation Calculator  Breast Cancer Risk Calculator  BRCA-Related Cancer Risk Assessment: FHS-7 Tool  FRAX Risk Assessment  ICSI Preventive Guidelines  Dietary Guidelines for Americans,   Dang [employer]'s MyPlate  ASA Prophylaxis  Lung CA Screening    Yaya Richard MD  [facility]  --------------------------------------------------------------------------------------------------------------------------------------  SUBJECTIVE:  Brittany Carver is a 41 year old female who presents to the clinic today for a routine physical exam.    The patient's last physical was 3-12-21    Cholesterol   Date Value Ref Range Status   2021 167 " <200 mg/dL Final   11/19/2019 170 <200 mg/dL Final     HDL Cholesterol   Date Value Ref Range Status   03/05/2021 67 >49 mg/dL Final   11/19/2019 60 >49 mg/dL Final     LDL Cholesterol Calculated   Date Value Ref Range Status   03/05/2021 85 <100 mg/dL Final     Comment:     Desirable:       <100 mg/dl   11/19/2019 86 <100 mg/dL Final     Comment:     Desirable:       <100 mg/dl     Triglycerides   Date Value Ref Range Status   03/05/2021 74 <150 mg/dL Final     Comment:     Fasting specimen   11/19/2019 122 <150 mg/dL Final     Comment:     Fasting specimen     Cholesterol/HDL Ratio   Date Value Ref Range Status   08/30/2010 2.9 0.0 - 5.0 Final     The patient's last fasting lipid panel was done 1 years ago and the results are listed above.    The 10-year ASCVD risk score (Earl MILLER Jr., et al., 2013) is: 0.2%    Values used to calculate the score:      Age: 41 years      Sex: Female      Is Non- : No      Diabetic: No      Tobacco smoker: No      Systolic Blood Pressure: 109 mmHg      Is BP treated: No      HDL Cholesterol: 67 mg/dL      Total Cholesterol: 167 mg/dL    Risk Enhancers:          The patient reports that she has never been treated for high blood pressure.    The patient reports that she does not take a daily aspirin.        Lab Results   Component Value Date    HCVAB Nonreactive 03/05/2021      The patient reports that she has been screened for Hepatitis C    (Screen all baby boomers once per CDC-- the generation born from 1945 through 1965 and per USPTF screen age 19 to 79 especially younger people who have used IV drugs)  She would not like to have an Hepatitis C test today    No results found for: HIAGAB  The patient reports that she has been screened for HIV   (Screen all 15 to 64 years old)  She would not like to have an HIV test today        Immunization History   Administered Date(s) Administered     TD (ADULT, 7+) 04/01/2004     The patient has not started the Gardasil  vaccination series.  The patient's believes that her last tetanus shot was given 22 year(s) ago.   The patient believes that she has not had a Shingrix in the past  The patient believes that she has not had a PPSV23 in the past.  The patient believes that she has not had a PCV13 in the past.  The patient believes that she has not had a seasonal flu vaccination this fall or winter.  The patient would like to have a no vaccinations today      No results found for this or any previous visit.]   The patient denies a family history of colon cancer.  The patient reports that she has not had a colonoscopy.       The patient reports a family history diabetes in her grandmother.        The patient reports that she does performs a self breast exam monthly.  The patient reports a family history of breast cancer.  The patient does want to have a mammogram done.  The patient does not want to have a Pap smear done.  She reports that she has not had an abnormal pap smear.  The patient is not interested in contraception. She is using condoms.    The patient reports that she eats or drinks 3 servings of dairy products per day.         The patient reports that she has dental appointments approximately every 6 months.  The patient reports that she  has an eye examination approximately every 1.0 year(s).        Do you currently smoke? No  How many years have you smoked? 0  How many packs per day did you smoke on average? N/A  (if more than 30 pack year history and the patient is age 55-80 consider ordering an annual low dose radiation lung CT to screen for cancer)  (Do not order if patient has quit more than 15 years ago or has a health condition that limits life expectancy or could not tolerate curative lung surgery)  Are you interested having a lung CT to screen for lung cancer? N/A              Past Medical History:   Diagnosis Date     NO ACTIVE PROBLEMS      Thyroid disease A few yrs ago       Past Surgical History:   Procedure  Laterality Date     TONSILLECTOMY  2001    due to obstruction     ZZC APPENDECTOMY  1993       Family History   Problem Relation Age of Onset     Breast Cancer Mother 55        age 55     Thyroid Disease Mother         late in life     Thyroid Disease Maternal Grandmother         late in life     Alzheimer Disease Maternal Grandfather      Thyroid Disease Maternal Grandfather      Diabetes Paternal Grandmother         diet and exercise controlled     Thyroid Disease Maternal Aunt      Thyroid Disease Maternal Uncle      Cancer Paternal Grandfather      Thyroid Disease Other      C.A.D. No family hx of      Hypertension No family hx of      Cancer - colorectal No family hx of      Anesthesia Reaction No family hx of      Blood Disease No family hx of        Social History     Socioeconomic History     Marital status:      Spouse name: Not on file     Number of children: Not on file     Years of education: Not on file     Highest education level: Not on file   Occupational History     Not on file   Tobacco Use     Smoking status: Never Smoker     Smokeless tobacco: Never Used   Vaping Use     Vaping Use: Never used   Substance and Sexual Activity     Alcohol use: No     Drug use: No     Sexual activity: Yes     Partners: Male     Birth control/protection: Condom   Other Topics Concern     Parent/sibling w/ CABG, MI or angioplasty before 65F 55M? No   Social History Narrative     Not on file     Social Determinants of Health     Financial Resource Strain: Not on file   Food Insecurity: Not on file   Transportation Needs: Not on file   Physical Activity: Not on file   Stress: Not on file   Social Connections: Not on file   Intimate Partner Violence: Not on file   Housing Stability: Not on file       Current Outpatient Medications   Medication Sig Dispense Refill     ibuprofen (ADVIL/MOTRIN) 200 MG capsule Take 200 mg by mouth every 4 hours as needed for fever       levothyroxine (SYNTHROID/LEVOTHROID) 50 MCG  "tablet TAKE ONE TABLET BY MOUTH ONCE DAILY 90 tablet 0     MULTI-VITAMIN OR 3 tablets once a day         PHYSICAL EXAMINATION:  Blood pressure 109/70, pulse 69, temperature 97.9  F (36.6  C), temperature source Tympanic, resp. rate 16, height 1.651 m (5' 5\"), weight 77.1 kg (170 lb), last menstrual period 06/09/2022, SpO2 100 %, unknown if currently breastfeeding.  General appearance - healthy, alert and no distress  Skin - Skin color, texture, turgor normal. No rashes or lesions.  Head - Normocephalic. No masses, lesions, tenderness or abnormalities  Eyes - conjunctivae/corneas clear. PERRL, EOM's intact. Fundi benign  Ears - External ears normal. Canals clear. TM's normal.  Nose/Sinuses - Nares normal. Septum midline. Mucosa normal. No drainage or sinus tenderness.  Oropharynx - Lips, mucosa, and tongue normal. Teeth and gums normal.  Neck - Neck supple. No adenopathy. Thyroid symmetric, normal size,  Lungs - Percussion normal. Good diaphragmatic excursion. Lungs clear  Heart - PMI normal. No lifts, heaves, or thrills. RRR. No murmurs, clicks gallops or rub  Breasts - Breasts normal to inspection and palpation. Axillae negative  Abdomen - Abdomen soft, non-tender. BS normal. No masses, organomegaly  Extremities - Extremities normal. No deformities, edema, or skin discoloration.  Musculoskeletal - Spine ROM normal. Muscular strength intact.  Peripheral pulses - radial=4/4, femoral=4/4, popliteal=4/4, dorsalis pedis=4/4,  Neuro - Gait normal. Reflexes normal and symmetric. Sensation grossly WNL..        Office Visit on 11/01/2021   Component Date Value Ref Range Status     WBC Count 11/01/2021 7.1  4.0 - 11.0 10e3/uL Final     RBC Count 11/01/2021 4.82  3.80 - 5.20 10e6/uL Final     Hemoglobin 11/01/2021 12.8  11.7 - 15.7 g/dL Final     Hematocrit 11/01/2021 40.5  35.0 - 47.0 % Final     MCV 11/01/2021 84  78 - 100 fL Final     MCH 11/01/2021 26.6  26.5 - 33.0 pg Final     [facility] 11/01/2021 31.6  31.5 - 36.5 g/dL " Final     RDW 11/01/2021 15.7 (A) 10.0 - 15.0 % Final     Platelet Count 11/01/2021 317  150 - 450 10e3/uL Final       ASSESSMENT:    ICD-10-CM    1. Routine general medical examination at a health care facility  Z00.00        Well-Adult Physical Exam.  Health Maintenance Due   Topic Date Due     ANNUAL REVIEW OF HM ORDERS  Never done     COVID-19 Vaccine (1) Never done     DTAP/TDAP/TD IMMUNIZATION (2 - Td or Tdap) 04/01/2014     Health Maintenance   Topic Date Due     ANNUAL REVIEW OF HM ORDERS  Never done     COVID-19 Vaccine (1) Never done     DTAP/TDAP/TD IMMUNIZATION (2 - Td or Tdap) 04/01/2014     INFLUENZA VACCINE (Season Ended) 09/01/2022     PREVENTIVE CARE VISIT  06/14/2023     HPV TEST  11/26/2024     PAP  11/26/2024     ADVANCE CARE PLANNING  03/12/2026     HEPATITIS C SCREENING  Completed     HIV SCREENING  Completed     PHQ-2 (once per calendar year)  Completed     Pneumococcal Vaccine: Pediatrics (0 to 5 Years) and At-Risk Patients (6 to 64 Years)  Aged Out     IPV IMMUNIZATION  Aged Out     MENINGITIS IMMUNIZATION  Aged Out     HEPATITIS B IMMUNIZATION  Aged Out         HEALTH CARE MAINTENENCE: The recommended screening tests and vaccinatons for this patient have been discussed as above.  The appropriate tests and vaccinations  have been ordered or declined by the patient. Please see the orders in EPIC.The patient specifically declines: all vaccination(s)     Immunization Status:  up to date and documented except for all recommend(ed) vaccination(s)     Patient Active Problem List   Diagnosis     CARDIOVASCULAR SCREENING; LDL GOAL LESS THAN 160     Thyroid function study abnormality     Encounter for supervision of other normal pregnancy     Rh negative status during pregnancy     Low-lying placenta     Vaccination refused by patient        ATP III Guidelines  ICSI Preventive Guidelines    PLAN:  Tetanus booster recommended  Multiple  vaccines recommended  Breast self exam demonstrated and  recommended  Check a fasting glucose  Mammogram recommended  Pap smear was recommended and obtained  Discussed calcium intake, vitamins and supplements. Recommended 1200 mg of calcium daily  Sunscreen use was recommended especially in the area of tatoos  Recommended dental exams every 6 months  Follow up in 1 year for the next preventative medical visit      Osteoporosis TX indications:  Post menopausal women with a hip or vertebral fracture  Any T score less than or equal to -2.5  Any T score between -1.0 and -2.5 and a 10 year hip fracture probability > or = to 3%  Any T score between -1.0 and -2.5 and a 10 year probability or a major osteoporosis-related fracture  > or = 20% based on FRAX score  [email or web address]      (Z83.3) Family history of diabetes mellitus  Comment:   Plan: Hemoglobin A1c            (Z80.3) Family history of malignant neoplasm of breast  Comment:   Plan: MA Screening Digital Bilateral            (Z12.31) Encounter for screening mammogram for breast cancer  Comment:   Plan: MA Screening Digital Bilateral            (E03.9) Hypothyroidism, unspecified type  Comment:   Plan: TSH with free T4 reflex

## 2022-06-14 NOTE — PATIENT INSTRUCTIONS
Preventive Health Recommendations  Female Ages 40 to 49    Yearly exam:   See your health care provider every year in order to  Review health changes.   Discuss preventive care.    Review your medicines if your doctor prescribed any.    Get a Pap test every three years (unless you have an abnormal result and your provider advises testing more often).    If you get Pap tests with HPV test, you only need to test every 5 years, unless you have an abnormal result. You do not need a Pap test if your uterus was removed (hysterectomy) and you have not had cancer.    You should be tested each year for STDs (sexually transmitted diseases), if you're at risk.   Ask your doctor if you should have a mammogram.    Have a colonoscopy (test for colon cancer) if someone in your family has had colon cancer or polyps before age 50.     Have a cholesterol test every 5 years.     Have a diabetes test (fasting glucose) after age 45. If you are at risk for diabetes, you should have this test every 3 years.    Shots: Get a flu shot each year. Get a tetanus shot every 10 years.     Nutrition:   Eat at least 5 servings of fruits and vegetables each day.  Eat whole-grain bread, whole-wheat pasta and brown rice instead of white grains and rice.  Get adequate Calcium and Vitamin D.      Lifestyle  Exercise at least 150 minutes a week (an average of 30 minutes a day, 5 days a week). This will help you control your weight and prevent disease.  Limit alcohol to one drink per day.  No smoking.   Wear sunscreen to prevent skin cancer.  See your dentist every six months for an exam and cleaning.    Please call our [facility] Scheduling Line at [phone] to schedule your:    Mammogram

## 2022-06-14 NOTE — NURSING NOTE
"Chief Complaint   Patient presents with     Physical       Initial BP (!) 143/75   Pulse 69   Temp 97.9  F (36.6  C) (Tympanic)   Resp 16   Ht 1.651 m (5' 5\")   Wt 77.1 kg (170 lb)   LMP 06/09/2022   SpO2 100%   BMI 28.29 kg/m   Estimated body mass index is 28.29 kg/m  as calculated from the following:    Height as of this encounter: 1.651 m (5' 5\").    Weight as of this encounter: 77.1 kg (170 lb).  Medication Reconciliation: complete  Kiesha Carroll CMA  "

## 2022-06-15 DIAGNOSIS — E03.9 HYPOTHYROIDISM, UNSPECIFIED TYPE: ICD-10-CM

## 2022-06-15 RX ORDER — LEVOTHYROXINE SODIUM 75 UG/1
75 TABLET ORAL DAILY
Qty: 30 TABLET | Refills: 2 | Status: SHIPPED | OUTPATIENT
Start: 2022-06-15

## 2023-01-08 ENCOUNTER — HEALTH MAINTENANCE LETTER (OUTPATIENT)
Age: 43
End: 2023-01-08

## 2023-07-16 ENCOUNTER — HEALTH MAINTENANCE LETTER (OUTPATIENT)
Age: 43
End: 2023-07-16

## 2024-02-11 ENCOUNTER — HEALTH MAINTENANCE LETTER (OUTPATIENT)
Age: 44
End: 2024-02-11

## 2024-09-08 ENCOUNTER — HEALTH MAINTENANCE LETTER (OUTPATIENT)
Age: 44
End: 2024-09-08